# Patient Record
Sex: MALE | Race: WHITE | Employment: FULL TIME | URBAN - NONMETROPOLITAN AREA
[De-identification: names, ages, dates, MRNs, and addresses within clinical notes are randomized per-mention and may not be internally consistent; named-entity substitution may affect disease eponyms.]

---

## 2017-02-02 ENCOUNTER — OFFICE VISIT (OUTPATIENT)
Dept: PRIMARY CARE CLINIC | Age: 57
End: 2017-02-02

## 2017-02-02 VITALS
HEART RATE: 88 BPM | OXYGEN SATURATION: 97 % | BODY MASS INDEX: 27.94 KG/M2 | SYSTOLIC BLOOD PRESSURE: 120 MMHG | WEIGHT: 178 LBS | DIASTOLIC BLOOD PRESSURE: 82 MMHG | TEMPERATURE: 99.6 F | HEIGHT: 67 IN

## 2017-02-02 DIAGNOSIS — J01.40 ACUTE PANSINUSITIS, RECURRENCE NOT SPECIFIED: Primary | ICD-10-CM

## 2017-02-02 DIAGNOSIS — R05.9 COUGH: ICD-10-CM

## 2017-02-02 LAB
INFLUENZA A ANTIBODY: NORMAL
INFLUENZA B ANTIBODY: NORMAL

## 2017-02-02 PROCEDURE — 99202 OFFICE O/P NEW SF 15 MIN: CPT | Performed by: PHYSICIAN ASSISTANT

## 2017-02-02 PROCEDURE — 87804 INFLUENZA ASSAY W/OPTIC: CPT | Performed by: PHYSICIAN ASSISTANT

## 2017-02-02 RX ORDER — DEXTROMETHORPHAN HYDROBROMIDE AND PROMETHAZINE HYDROCHLORIDE 15; 6.25 MG/5ML; MG/5ML
5 SYRUP ORAL 4 TIMES DAILY PRN
Qty: 150 ML | Refills: 0 | Status: SHIPPED | OUTPATIENT
Start: 2017-02-02 | End: 2017-02-09

## 2017-02-02 RX ORDER — SULFAMETHOXAZOLE AND TRIMETHOPRIM 800; 160 MG/1; MG/1
1 TABLET ORAL 2 TIMES DAILY
Qty: 20 TABLET | Refills: 0 | Status: SHIPPED | OUTPATIENT
Start: 2017-02-02 | End: 2017-02-12

## 2017-02-02 ASSESSMENT — ENCOUNTER SYMPTOMS
COUGH: 1
SHORTNESS OF BREATH: 1

## 2023-07-26 ENCOUNTER — OFFICE VISIT (OUTPATIENT)
Dept: DERMATOLOGY | Age: 63
End: 2023-07-26
Payer: COMMERCIAL

## 2023-07-26 VITALS
BODY MASS INDEX: 31.55 KG/M2 | TEMPERATURE: 97.9 F | HEIGHT: 67 IN | SYSTOLIC BLOOD PRESSURE: 144 MMHG | DIASTOLIC BLOOD PRESSURE: 80 MMHG | WEIGHT: 201 LBS

## 2023-07-26 DIAGNOSIS — L73.8 SEBACEOUS HYPERPLASIA: ICD-10-CM

## 2023-07-26 DIAGNOSIS — D48.5 NEOPLASM OF UNCERTAIN BEHAVIOR OF SKIN: Primary | ICD-10-CM

## 2023-07-26 PROCEDURE — 11102 TANGNTL BX SKIN SINGLE LES: CPT | Performed by: DERMATOLOGY

## 2023-07-26 PROCEDURE — 11103 TANGNTL BX SKIN EA SEP/ADDL: CPT | Performed by: DERMATOLOGY

## 2023-07-26 PROCEDURE — 99202 OFFICE O/P NEW SF 15 MIN: CPT | Performed by: DERMATOLOGY

## 2023-07-26 RX ORDER — LIDOCAINE HYDROCHLORIDE AND EPINEPHRINE 10; 10 MG/ML; UG/ML
1 INJECTION, SOLUTION INFILTRATION; PERINEURAL ONCE
Status: SHIPPED | OUTPATIENT
Start: 2023-07-26

## 2023-07-26 RX ORDER — LIDOCAINE HYDROCHLORIDE AND EPINEPHRINE 10; 10 MG/ML; UG/ML
20 INJECTION, SOLUTION INFILTRATION; PERINEURAL ONCE
Status: CANCELLED | OUTPATIENT
Start: 2023-07-26 | End: 2023-07-26

## 2023-07-26 NOTE — PATIENT INSTRUCTIONS
BIOPSY WOUND CARE    A biopsy is where a small piece of skin tissue is removed and examined by a pathologist.  When a biopsy is done, there is a small wound site that requires proper care to prevent infection and scarring. Some biopsies require sutures and their removal.    How to Care for Biopsy Wound    A.  Leave band-aid or dressing on for 24 hours. B. Wash two times a day with soap and water. C.  Let the wound air dry, then apply Vaseline ointment and cover with a Band-Aid       unless otherwise instructed by your provider. D. If there is slight discomfort, you may give acetaminophen or ibuprofen. When To Call the Doctor    Call the Dermatology Clinic or your doctor if any of the following occur:    A. Redness and swelling  B. Tenderness and warm to touch  C.  Drainage from wound  D. Fever    Biopsy Results    Biopsy results are usually available in 1-2 weeks. We provide biopsy results in letters or via [employer] for benign results or we will call for any concerning results. If you have not heard from our staff please call the office within 2 weeks. Please call our office with any concerns at [phone]. Sun Protection     There are two types of sun rays that are harmful to the skin. UVA rays cause skin aging and skin cancer, such as melanoma. UVB rays cause sunburns, cataracts, and also contribute to skin cancer. The American-Academy of Dermatology recommends that children and adults wear a broad spectrum, waterproof sunscreen with a Sun Protection Factor (SPF) of 30 or higher. It is important to check the ingredient label to be sure the sunscreen will protect the skin from both UVA and UVB sunrays. Your sunscreen should contain at least one of the following ingredients: titanium dioxide, zinc oxide, or avobenzone. Sunscreen will not be effective unless it is applied to all exposed skin. Sunscreens work best if they are applied 30 minutes before sun exposure.   They should be

## 2023-07-28 ENCOUNTER — NURSE ONLY (OUTPATIENT)
Dept: LAB | Age: 63
End: 2023-07-28

## 2023-08-01 NOTE — RESULT ENCOUNTER NOTE
The lesions we biopsied are the most common and least serious form of skin cancer, basal cell carcinomas. The large one on the right shoulder should be treated with mohs surgery, which gets the highest cure rate (>99%) while removing the least amount of skin. Referral will be placed to Dr. Yasmine Mcclure. The lesion on the right back can be treated with electrodesiccation and curettage, also known as scrape and burn. Please schedule 30 minute  procedure visit with me. Please schedule follow-up with me in 6 months if not already scheduled.

## 2023-09-26 ENCOUNTER — PROCEDURE VISIT (OUTPATIENT)
Dept: DERMATOLOGY | Age: 63
End: 2023-09-26
Payer: COMMERCIAL

## 2023-09-26 VITALS
BODY MASS INDEX: 31.36 KG/M2 | HEART RATE: 75 BPM | DIASTOLIC BLOOD PRESSURE: 91 MMHG | TEMPERATURE: 98.3 F | WEIGHT: 200.2 LBS | OXYGEN SATURATION: 97 % | SYSTOLIC BLOOD PRESSURE: 161 MMHG

## 2023-09-26 DIAGNOSIS — C44.519 BASAL CELL CARCINOMA OF BACK: Primary | ICD-10-CM

## 2023-09-26 PROCEDURE — 17262 DSTRJ MAL LES T/A/L 1.1-2.0: CPT | Performed by: DERMATOLOGY

## 2023-09-26 NOTE — PROGRESS NOTES
Dermatology Surgery Pre-Visit Checklist    (Please complete with  Y (yes) / N (no) or explain)    Pathologic Diagnosis: bcc    Photo available of surgery site in media: y    Competent to give informed consent? y   If not, who is authorized to do so: y    Need for help for wound care: n   If so, who will do so: n    Are you diagnosed:   Diabetes: n   If yes, last A1C: n       HIV: n       Hepatitis: n       Current smoker: n  If yes, how much: n    So you have any of the following: Pacemaker: n       Defibrillator: n       Artificial Heart valve: n                Artificial Joints: n       Other Implantable Device: n       Organ Transplant: n       Other: n    Are you on blood thinners such as: Asprin: n       Warfarin/Coumadin: n       Other: n    Any allergies to the following:  Lidocaine: n       Iodine: n       Adhesive: y       Other: n

## 2023-09-26 NOTE — PROGRESS NOTES
Dermatology Procedure Note   [address] DERMATOLOGY  [address] [place] [address] [address] [address]  Dept: [phone]  Dept Fax: [phone]      Procedure Date: 9/26/2023  Procedure Time: 9:03 AM    Procedure Practitioner: Ashley Wong MD    Procedure: Lesion Destruction    Pre-Procedure Diagnosis: Basal Cell Carcinoma    Post-Procedure Diagnosis: Same as Pre-Procedure Diagnosis    Informed Consent: The procedure and its risks were explained including but not limited to pain, bleeding, infection, permanent scar, permanent pigment alteration and recurrence. Consent to proceed with the procedure was obtained from the patient or the parent by the practitioner    Time Out:  A time out was conducted immediately before starting the procedure that confirmed a final verification of the correct patient, correct procedure, and correct site. Procedure Details:  Malignant Destruction: The procedure and its risks were explained including but not limited to pain, bleeding, infection, permanent scar, permanent pigment alteration and need for an additional procedure. Consent to proceed with the procedure was obtained from the patient or the parent. After cleaning with alcohol the 8 mm BCC on the right back was anesthetized with buffered 1% lidocaine with epinephrine and treated with 3 rounds of curettage and fulguration. After the first round the defect was 14 mm. The defect was coated in Vaseline and a bandage was applied.       Procedure Performed By: Ashley Wong MD    Estimated Blood Loss: Minimal    Pathologic Specimen: none sent    Procedure Tolerance: Good    Complication(s): None    Electronically signed by Ashley Wong MD on 9/26/23 at 9:03 AM EDT

## 2024-01-24 NOTE — PROGRESS NOTES
as patient denied having any lesions in this area. Complete visualization of scalp may be limited by hair density, length, and/or style    Facial covering was removed during examination.    Diagnoses/exam findings/medical history pertinent to this visit are listed below:    Assessment:   Diagnosis Orders   1. History of basal cell carcinoma (BCC)        2. Sebaceous hyperplasia        3. Cherry angioma        4. Multiple nevi        5. Seborrheic keratoses             Plan:  History of BCC  - well healed scar with no nodularity  - no evidence of recurrence    Sebaceous hyperplasia   - reassurance and education    Cherry angiomas of scalp, trunk  - reassurance and education    Multiple nevi  - Clinically and dermatoscopically benign on exam today.  - Common nevi have a low individual risk of developing into melanoma. Patients with >50 nevi have a greater risk of developing melanoma in their lifetime and should undergo skin checks at least annually.  - I recommended the patient apply broad spectrum spf 30+ sunscreen daily, reapplying every 2 hours  - In additional to regular use of sunscreen, I recommended broad-rimmed hats, long sleeves, and seeking the shade.    Seborrheic keratoses of trunk, ext  - reassurance and education      RTC 6M FBSE    No future appointments.        Patient Instructions   Sun Protection     There are two types of sun rays that are harmful to the skin.  UVA rays cause skin aging and skin cancer, such as melanoma.  UVB rays cause sunburns, cataracts, and also contribute to skin cancer.    The American-Academy of Dermatology recommends that children and adults wear a broad spectrum, waterproof sunscreen with a Sun Protection Factor (SPF) of 30 or higher.  It is important to check the ingredient label to be sure the sunscreen will protect the skin from both UVA and UVB sunrays.  Your sunscreen should contain at least one of the following ingredients: titanium dioxide, zinc oxide, or

## 2024-01-30 ENCOUNTER — OFFICE VISIT (OUTPATIENT)
Dept: DERMATOLOGY | Age: 64
End: 2024-01-30
Payer: COMMERCIAL

## 2024-01-30 VITALS
HEART RATE: 84 BPM | OXYGEN SATURATION: 98 % | SYSTOLIC BLOOD PRESSURE: 158 MMHG | DIASTOLIC BLOOD PRESSURE: 89 MMHG | TEMPERATURE: 97.4 F | BODY MASS INDEX: 31.61 KG/M2 | WEIGHT: 201.8 LBS

## 2024-01-30 DIAGNOSIS — L82.1 SEBORRHEIC KERATOSES: ICD-10-CM

## 2024-01-30 DIAGNOSIS — Z85.828 HISTORY OF BASAL CELL CARCINOMA (BCC): Primary | ICD-10-CM

## 2024-01-30 DIAGNOSIS — L73.8 SEBACEOUS HYPERPLASIA: ICD-10-CM

## 2024-01-30 DIAGNOSIS — D22.9 MULTIPLE NEVI: ICD-10-CM

## 2024-01-30 DIAGNOSIS — D18.01 CHERRY ANGIOMA: ICD-10-CM

## 2024-01-30 PROCEDURE — 99213 OFFICE O/P EST LOW 20 MIN: CPT | Performed by: DERMATOLOGY

## 2024-01-30 NOTE — PATIENT INSTRUCTIONS
Sun Protection     There are two types of sun rays that are harmful to the skin.  UVA rays cause skin aging and skin cancer, such as melanoma.  UVB rays cause sunburns, cataracts, and also contribute to skin cancer.    The American-Academy of Dermatology recommends that children and adults wear a broad spectrum, waterproof sunscreen with a Sun Protection Factor (SPF) of 30 or higher.  It is important to check the ingredient label to be sure the sunscreen will protect the skin from both UVA and UVB sunrays.  Your sunscreen should contain at least one of the following ingredients: titanium dioxide, zinc oxide, or avobenzone.    Sunscreen will not be effective unless it is applied to all exposed skin.  Sunscreens work best if they are applied 30 minutes before sun exposure.  They should be reapplied every 2 hours and after any water exposure.    Sunscreen is not perfect.  It is important to use other methods to protect the skin from sun exposure also.  Wear hats, sunglasses and other sun protective clothing when outdoors.  Stay in the shade during the peak hours of sun exposure between 10 AM and 4 PM.    
Home

## 2024-07-18 NOTE — PROGRESS NOTES
anesthetized with 1% lidocaine with epinephrine and was removed with a dermablade. Hemostasis was achieved with aluminum chloride and Vaseline and a bandage were applied.      RTC 6 month FBSE    No future appointments.        Patient Instructions   Sun Protection     There are two types of sun rays that are harmful to the skin.  UVA rays cause skin aging and skin cancer, such as melanoma.  UVB rays cause sunburns, cataracts, and also contribute to skin cancer.    The American-Academy of Dermatology recommends that children and adults wear a broad spectrum, waterproof sunscreen with a Sun Protection Factor (SPF) of 30 or higher.  It is important to check the ingredient label to be sure the sunscreen will protect the skin from both UVA and UVB sunrays.  Your sunscreen should contain at least one of the following ingredients: titanium dioxide, zinc oxide, or avobenzone.    Sunscreen will not be effective unless it is applied to all exposed skin.  Sunscreens work best if they are applied 30 minutes before sun exposure.  They should be reapplied every 2 hours and after any water exposure.    Sunscreen is not perfect.  It is important to use other methods to protect the skin from sun exposure also.  Wear hats, sunglasses and other sun protective clothing when outdoors.  Stay in the shade during the peak hours of sun exposure between 10 AM and 4 PM.        I, Brittany Hodge, personally scribed the services dictated to me by Dr. Velazquez in this documentation.     I, Dr. Velazquez, personally performed the services described in this documentation, as scribed by Brittany Hodge in my presence, and it is both accurate and complete.

## 2024-07-29 ENCOUNTER — OFFICE VISIT (OUTPATIENT)
Dept: DERMATOLOGY | Age: 64
End: 2024-07-29
Payer: COMMERCIAL

## 2024-07-29 ENCOUNTER — LAB (OUTPATIENT)
Dept: LAB | Age: 64
End: 2024-07-29

## 2024-07-29 VITALS
WEIGHT: 201 LBS | SYSTOLIC BLOOD PRESSURE: 147 MMHG | HEART RATE: 85 BPM | OXYGEN SATURATION: 98 % | HEIGHT: 67 IN | DIASTOLIC BLOOD PRESSURE: 90 MMHG | TEMPERATURE: 97.3 F | BODY MASS INDEX: 31.55 KG/M2

## 2024-07-29 DIAGNOSIS — D48.5 NEOPLASM OF UNCERTAIN BEHAVIOR OF SKIN: ICD-10-CM

## 2024-07-29 DIAGNOSIS — D22.9 MULTIPLE NEVI: ICD-10-CM

## 2024-07-29 DIAGNOSIS — D18.01 CHERRY ANGIOMA: ICD-10-CM

## 2024-07-29 DIAGNOSIS — C44.311 BASAL CELL CARCINOMA (BCC) OF RIGHT SIDE OF NOSE: ICD-10-CM

## 2024-07-29 DIAGNOSIS — L82.1 SEBORRHEIC KERATOSES: ICD-10-CM

## 2024-07-29 DIAGNOSIS — L73.8 SEBACEOUS HYPERPLASIA: ICD-10-CM

## 2024-07-29 DIAGNOSIS — Z85.828 HISTORY OF BASAL CELL CARCINOMA (BCC): Primary | ICD-10-CM

## 2024-07-29 PROCEDURE — 11102 TANGNTL BX SKIN SINGLE LES: CPT | Performed by: DERMATOLOGY

## 2024-07-30 RX ORDER — LIDOCAINE HYDROCHLORIDE AND EPINEPHRINE 10; 10 MG/ML; UG/ML
0.3 INJECTION, SOLUTION INFILTRATION; PERINEURAL ONCE
Status: COMPLETED | OUTPATIENT
Start: 2024-07-30 | End: 2024-07-30

## 2024-07-30 RX ADMIN — LIDOCAINE HYDROCHLORIDE AND EPINEPHRINE 0.3 ML: 10; 10 INJECTION, SOLUTION INFILTRATION; PERINEURAL at 11:56

## 2024-07-31 NOTE — RESULT ENCOUNTER NOTE
The lesion we biopsied is the most common and least serious form of skin cancer, a basal cell carcinoma. Due to location, it should be removed with mohs surgery, which gets the highest cure rate (>99%) while removing the least amount of skin. Referral will be placed to Dr. Garduno. Please schedule follow-up with me in 6 months if not already scheduled.

## 2025-01-30 ENCOUNTER — OFFICE VISIT (OUTPATIENT)
Age: 65
End: 2025-01-30
Payer: COMMERCIAL

## 2025-01-30 VITALS
SYSTOLIC BLOOD PRESSURE: 157 MMHG | WEIGHT: 199 LBS | TEMPERATURE: 98.6 F | BODY MASS INDEX: 31.23 KG/M2 | HEART RATE: 77 BPM | OXYGEN SATURATION: 97 % | HEIGHT: 67 IN | DIASTOLIC BLOOD PRESSURE: 96 MMHG

## 2025-01-30 DIAGNOSIS — D22.9 MULTIPLE NEVI: ICD-10-CM

## 2025-01-30 DIAGNOSIS — Z85.828 HISTORY OF BASAL CELL CANCER: Primary | ICD-10-CM

## 2025-01-30 DIAGNOSIS — L82.1 SEBORRHEIC KERATOSES: ICD-10-CM

## 2025-01-30 DIAGNOSIS — L73.8 SEBACEOUS HYPERPLASIA: ICD-10-CM

## 2025-01-30 PROCEDURE — 99213 OFFICE O/P EST LOW 20 MIN: CPT | Performed by: DERMATOLOGY

## 2025-01-30 NOTE — PATIENT INSTRUCTIONS
Sun Protection     There are two types of sun rays that are harmful to the skin.  UVA rays cause skin aging and skin cancer, such as melanoma.  UVB rays cause sunburns, cataracts, and also contribute to skin cancer.    The American-Academy of Dermatology recommends that children and adults wear a broad spectrum, waterproof sunscreen with a Sun Protection Factor (SPF) of 30 or higher.  It is important to check the ingredient label to be sure the sunscreen will protect the skin from both UVA and UVB sunrays.  Your sunscreen should contain at least one of the following ingredients: titanium dioxide, zinc oxide, or avobenzone.    Sunscreen will not be effective unless it is applied to all exposed skin.  Sunscreens work best if they are applied 30 minutes before sun exposure.  They should be reapplied every 2 hours and after any water exposure.    Sunscreen is not perfect.  It is important to use other methods to protect the skin from sun exposure also.  Wear hats, sunglasses and other sun protective clothing when outdoors.  Stay in the shade during the peak hours of sun exposure between 10 AM and 4 PM.

## 2025-01-30 NOTE — PROGRESS NOTES
Dermatology Patient Note  [facility]  [facility] DERMATOLOGY  [address]  [place] [address]  Dept: [phone]  Dept Fax: [phone]      VISITDATE: 1/30/2025   REFERRING PROVIDER: No ref. provider found      Johan Fitzpatrick is a 64 y.o. male  who presents today in the office for:    No chief complaint on file.      HISTORY OF PRESENT ILLNESS:  As above. Patient presents for a 6 month FBSC. History of BCC of right nose s/p Mohs 9/20/2024 and of back, s/p ED&C 9/26/2023. Family history of BCC (mother). At the last visit, 7/29/24, he received a shave biopsy of the right nose which returned a superficial BCC. He was referred to Dr. Garduno for Mohs surgery.     ***      ALLERGIES:   No Known Allergies    SOCIAL HISTORY:  Social History     Tobacco Use    Smoking status: Never    Smokeless tobacco: Never   Substance Use Topics    Alcohol use: No       Pertinent ROS:  Review of Systems  Skin: Denies any new changing, growing or bleeding lesions or rashes except as described in the HPI   Constitutional: Denies fevers, chills, and malaise.    PHYSICAL EXAM:   There were no vitals taken for this visit.    The patient is generally well appearing, well nourished, alert and conversational. Affect is normal.    Cutaneous Exam:  Physical Exam  {Blank single:71011::\"Face and neck only was examined.\",\"Focused exam of *** was performed\",\"Acne exam: exam of face, neck, chest, and back was performed.\",\"Sun-exposed skin: head/face, neck, both arms, digits and nails were examined.\",\"Waist-up skin: the head/face,neck, both arms, chest, back, abdomen, digits and/or nails, was examined.\",\"Sun exposed + limited LEs: Head/face,neck, both arms, digits and/or nails and legs visible with pants/shorts and shoes/socks on was examined.\",\"Waist-up + limited LEs: Head/face,neck, both arms, chest, back, abdomen, digits and/or nails, and legs visible with pants/shorts and shoes/socks on was 
Wt 90.3 kg (199 lb)   SpO2 97%   BMI 31.17 kg/m²     The patient is generally well appearing, well nourished, alert and conversational. Affect is normal.    Cutaneous Exam:  Physical Exam  Total body skin exam excluding external genitalia: head/face, neck, both arms, chest, back, abdomen, both legs, buttocks, digits and/or nails, was examined. Genital exam was deferred as patient denied having any lesions in this area. Complete visualization of scalp may be limited by hair density, length, and/or style    Diagnoses/exam findings/medical history pertinent to this visit are listed below:    Assessment:   Diagnosis Orders   1. History of basal cell cancer        2. Sebaceous hyperplasia        3. Multiple nevi             Plan:  History of BCC  - well healed scar with no nodularity  - no evidence of recurrence     Sebaceous hyperplasia of face   - reassurance and education      Multiple nevi  - Clinically and dermatoscopically benign on exam today.  - Common nevi have a low individual risk of developing into melanoma. Patients with >50 nevi have a greater risk of developing melanoma in their lifetime and should undergo skin checks at least annually.  - I recommended the patient apply broad spectrum spf 30+ sunscreen daily, reapplying every 2 hours  - In additional to regular use of sunscreen, I recommended broad-rimmed hats, long sleeves, and seeking the shade.     Seborrheic keratoses of trunk, ext  - reassurance and education    Return in about 6 months (around 7/30/2025) for FBSC.    Future Appointments   Date Time Provider Department Center   8/4/2025  8:45 AM [facility]           Patient Instructions   Sun Protection     There are two types of sun rays that are harmful to the skin.  UVA rays cause skin aging and skin cancer, such as melanoma.  UVB rays cause sunburns, cataracts, and also contribute to skin cancer.    The American-Academy of Dermatology recommends that children and adults

## 2025-08-04 ENCOUNTER — OFFICE VISIT (OUTPATIENT)
Age: 65
End: 2025-08-04
Payer: COMMERCIAL

## 2025-08-04 VITALS
SYSTOLIC BLOOD PRESSURE: 146 MMHG | TEMPERATURE: 97.8 F | OXYGEN SATURATION: 97 % | BODY MASS INDEX: 29.98 KG/M2 | HEART RATE: 70 BPM | WEIGHT: 191 LBS | HEIGHT: 67 IN | DIASTOLIC BLOOD PRESSURE: 88 MMHG

## 2025-08-04 DIAGNOSIS — Z85.828 HISTORY OF BASAL CELL CANCER: Primary | ICD-10-CM

## 2025-08-04 DIAGNOSIS — D18.01 CHERRY ANGIOMA: ICD-10-CM

## 2025-08-04 DIAGNOSIS — D22.9 MULTIPLE NEVI: ICD-10-CM

## 2025-08-04 DIAGNOSIS — L73.8 SEBACEOUS HYPERPLASIA: ICD-10-CM

## 2025-08-04 DIAGNOSIS — L91.8 INFLAMED ACROCHORDON: ICD-10-CM

## 2025-08-04 DIAGNOSIS — L57.8 ACTINIC SKIN DAMAGE: ICD-10-CM

## 2025-08-04 PROCEDURE — 11200 RMVL SKIN TAGS UP TO&INC 15: CPT | Performed by: DERMATOLOGY

## 2025-08-04 PROCEDURE — 99213 OFFICE O/P EST LOW 20 MIN: CPT | Performed by: DERMATOLOGY
